# Patient Record
Sex: FEMALE | Race: WHITE
[De-identification: names, ages, dates, MRNs, and addresses within clinical notes are randomized per-mention and may not be internally consistent; named-entity substitution may affect disease eponyms.]

---

## 2018-03-13 ENCOUNTER — HOSPITAL ENCOUNTER (OUTPATIENT)
Dept: HOSPITAL 17 - HCAV | Age: 28
End: 2018-03-13
Attending: PSYCHIATRY & NEUROLOGY
Payer: MEDICAID

## 2018-03-13 DIAGNOSIS — F90.0: Primary | ICD-10-CM

## 2018-03-13 PROCEDURE — 93005 ELECTROCARDIOGRAM TRACING: CPT

## 2018-03-14 NOTE — EKG
Date Performed: 03/13/2018       Time Performed: 14:46:40

 

PTAGE:      28 years

 

EKG:      Sinus rhythm 

 

. Normal ECG based on available leads 

 

NO PREVIOUS TRACING            

 

DOCTOR:   Herminio Barajas  Interpretating Date/Time  03/14/2018 23:41:53